# Patient Record
Sex: MALE | URBAN - METROPOLITAN AREA
[De-identification: names, ages, dates, MRNs, and addresses within clinical notes are randomized per-mention and may not be internally consistent; named-entity substitution may affect disease eponyms.]

---

## 2019-07-11 ENCOUNTER — APPOINTMENT (RX ONLY)
Dept: URBAN - METROPOLITAN AREA CLINIC 306 | Facility: CLINIC | Age: 49
Setting detail: DERMATOLOGY
End: 2019-07-11

## 2019-07-11 DIAGNOSIS — L81.1 CHLOASMA: ICD-10-CM

## 2019-07-11 DIAGNOSIS — B07.8 OTHER VIRAL WARTS: ICD-10-CM

## 2019-07-11 DIAGNOSIS — B35.1 TINEA UNGUIUM: ICD-10-CM

## 2019-07-11 PROCEDURE — ? LIQUID NITROGEN

## 2019-07-11 PROCEDURE — 99202 OFFICE O/P NEW SF 15 MIN: CPT | Mod: 25

## 2019-07-11 PROCEDURE — ? ADDITIONAL NOTES

## 2019-07-11 PROCEDURE — ? PRESCRIPTION

## 2019-07-11 PROCEDURE — ? COUNSELING

## 2019-07-11 PROCEDURE — 17110 DESTRUCTION B9 LES UP TO 14: CPT

## 2019-07-11 RX ORDER — PHARMACY COMPOUNDING ACCESSORY
EACH MISCELLANEOUS
Qty: 1 | Refills: 0 | Status: ERX | COMMUNITY
Start: 2019-07-11

## 2019-07-11 RX ORDER — PHARMACY COMPOUNDING ACCESSORY
EACH MISCELLANEOUS
Qty: 1 | Refills: 0 | Status: ERX

## 2019-07-11 RX ORDER — TERBINAFINE HYDROCHLORIDE 250 MG/1
TABLET ORAL
Qty: 42 | Refills: 0 | Status: ERX | COMMUNITY
Start: 2019-07-11

## 2019-07-11 RX ADMIN — TERBINAFINE HYDROCHLORIDE: 250 TABLET ORAL at 21:04

## 2019-07-11 RX ADMIN — Medication: at 21:27

## 2019-07-11 ASSESSMENT — LOCATION DETAILED DESCRIPTION DERM
LOCATION DETAILED: LEFT CENTRAL MALAR CHEEK
LOCATION DETAILED: LEFT MIDDLE FINGERTIP
LOCATION DETAILED: LEFT 2ND TOENAIL
LOCATION DETAILED: LEFT DISTAL PLANTAR GREAT TOE

## 2019-07-11 ASSESSMENT — LOCATION SIMPLE DESCRIPTION DERM
LOCATION SIMPLE: LEFT GREAT TOE
LOCATION SIMPLE: LEFT 2ND TOE
LOCATION SIMPLE: LEFT CHEEK
LOCATION SIMPLE: LEFT MIDDLE FINGER

## 2019-07-11 ASSESSMENT — LOCATION ZONE DERM
LOCATION ZONE: TOE
LOCATION ZONE: FINGER
LOCATION ZONE: TOENAIL
LOCATION ZONE: FACE

## 2019-07-11 NOTE — PROCEDURE: ADDITIONAL NOTES
Detail Level: Simple
Additional Notes: Topical lidocaine was applied 10 minutes before treating. Discussed compounded medication 5-fu/salicylic acid - apply nightly under occlusion - side effects discussed including extreme redness/pain.

## 2019-07-11 NOTE — PROCEDURE: LIQUID NITROGEN
Post-Care Instructions: I reviewed with the patient in detail post-care instructions. Patient is to wear sunprotection, and avoid picking at any of the treated lesions. Pt may apply Vaseline to crusted or scabbing areas.
Detail Level: Detailed
Medical Necessity Information: It is in your best interest to select a reason for this procedure from the list below. All of these items fulfill various CMS LCD requirements except the new and changing color options.
Consent: The patient's consent was obtained including but not limited to risks of crusting, scabbing, blistering, scarring, darker or lighter pigmentary change, recurrence, incomplete removal and infection.
Render Post-Care Instructions In Note?: no
Number Of Freeze-Thaw Cycles: 3 freeze-thaw cycles
Medical Necessity Clause: This procedure was medically necessary because the lesions that were treated were:

## 2019-07-11 NOTE — PROCEDURE: MIPS QUALITY
Quality 134: Screening For Clinical Depression And Follow-Up Plan: Depression Screening not documented, reason not given
Quality 402: Tobacco Use And Help With Quitting Among Adolescents: Patient screened for tobacco and never smoked
Quality 431: Preventive Care And Screening: Unhealthy Alcohol Use - Screening: Patient screened for unhealthy alcohol use using a single question and scores less than 2 times per year
Quality 397: Melanoma: Reporting: Pathology does not include pT category and/or statement on Breslow depth, ulceration, and pT1 mitotic reporting.
Quality 155: Falls Plan Of Care: Plan of Care not Documented, Reason not Otherwise Specified
Quality 137: Melanoma: Continuity Of Care - Recall System: Recall system not utilized, reason not otherwise specified
Quality 154 Part A: Falls: Risk Assessment (Should Be Reported With Measure 155.): Falls risk assessment not completed, reason not otherwise specified
Quality 143: Oncology: Medical And Radiation- Pain Intensity Quantified: Pain severity not assessed.
Detail Level: Detailed
Quality 131: Pain Assessment And Follow-Up: No documentation of pain assessment, reason not given
Quality 130: Documentation Of Current Medications In The Medical Record: Current Medications Documented
Quality 138: Melanoma: Coordination Of Care: Treatment plan not communicated, reason not otherwise specified.
Quality 154 Part B: Falls: Risk Screening (Should Be Reported With Measure 155.): No documentation of falls status

## 2019-07-11 NOTE — HPI: SKIN LESION
What Type Of Note Output Would You Prefer (Optional)?: Standard Output
How Severe Is Your Skin Lesion?: moderate
Has Your Skin Lesion Been Treated?: not been treated
Is This A New Presentation, Or A Follow-Up?: Skin Lesions
Additional History: Patient tried over the counter freezing. Ht: 64 Wt: 175
Additional History: Patient has been trying over the counter nail fungus treatments which have not helped.

## 2019-07-11 NOTE — PROCEDURE: COUNSELING
Detail Level: Detailed
Patient Specific Counseling (Will Not Stick From Patient To Patient): Recommended avoiding etoh during course of medication.
Detail Level: Zone